# Patient Record
Sex: MALE | Race: WHITE | NOT HISPANIC OR LATINO
[De-identification: names, ages, dates, MRNs, and addresses within clinical notes are randomized per-mention and may not be internally consistent; named-entity substitution may affect disease eponyms.]

---

## 2020-08-12 ENCOUNTER — APPOINTMENT (OUTPATIENT)
Dept: ORTHOPEDIC SURGERY | Facility: CLINIC | Age: 65
End: 2020-08-12
Payer: COMMERCIAL

## 2020-08-12 VITALS
HEIGHT: 77 IN | BODY MASS INDEX: 28.93 KG/M2 | HEART RATE: 77 BPM | DIASTOLIC BLOOD PRESSURE: 94 MMHG | SYSTOLIC BLOOD PRESSURE: 160 MMHG | WEIGHT: 245 LBS

## 2020-08-12 DIAGNOSIS — Z87.39 PERSONAL HISTORY OF OTHER DISEASES OF THE MUSCULOSKELETAL SYSTEM AND CONNECTIVE TISSUE: ICD-10-CM

## 2020-08-12 PROCEDURE — 99203 OFFICE O/P NEW LOW 30 MIN: CPT

## 2020-08-12 PROCEDURE — 73560 X-RAY EXAM OF KNEE 1 OR 2: CPT | Mod: RT

## 2020-08-17 NOTE — ADDENDUM
[FreeTextEntry1] : This note was written by Felicitas Penaloza on 08/17/2020 acting as a scribe for STEVE AGUILAR III, MD

## 2020-08-17 NOTE — DISCUSSION/SUMMARY
[de-identified] : At this time, due to osteoarthritis of the right knee, we had a long discussion concerning his options.  At this point, we are going to start him on a custom GII .  We discussed viscosupplementation.\par \par

## 2020-08-17 NOTE — HISTORY OF PRESENT ILLNESS
[(Does not interfere) 0] : the ailment interference is 0/10 (does not interfere) [1] : the ailment interference is 1/10 [5] : the ailment interference is 5/10 [de-identified] : The patient comes in today with complaints of pain to his right knee.  He states it has been going on for several months, getting a little worse recently.  The patient describes the pain as sharp at times.  The patient notes rest makes his symptoms better, while stairs makes his symptoms worse.  The patient indicates a pain level of 6 on a pain scale of 0-10.    [] : No

## 2020-08-17 NOTE — PHYSICAL EXAM
[de-identified] : Left Knee: \par Range of Motion in Degrees	\par 	                  Claimant:	Normal:	\par Flexion Active	  135 	                135-degrees	\par Flexion Passive	  135	                135-degrees	\par Extension Active	  0-5	                0-5-degrees	\par Extension Passive	  0-5	                0-5-degrees	\par \par No weakness to flexion/extension.  No evidence of instability in the AP plane or varus or valgus stress.  Negative  Lachman.  Negative pivot shift.  Negative anterior drawer test.  Negative posterior drawer test.  Negative Gonzalo.  Negative Apley grind.  No medial or lateral joint line tenderness.  No tenderness over the medial and lateral facet of the patella.  No patellofemoral crepitations.  No lateral tilting patella.  No patellar apprehension.  No crepitation in the medial and lateral femoral condyle.  No proximal or distal swelling, edema or tenderness.  No gross motor or sensory deficits.  No intra-articular swelling.  2+ DP and PT pulses. No varus or valgus malalignment.  Skin is intact.  No rashes, scars or lesions.\par \par Right Knee: \par Range of Motion in Degrees	\par 	                  Claimant:	Normal:	\par Flexion Active	  120 	                135-degrees	\par Flexion Passive	  120	                135-degrees	\par Extension Active	   10	                0-5-degrees	\par Extension Passive	   10	                0-5-degrees	\par \par No weakness to flexion/extension.  No evidence of instability in the AP plane or varus or valgus stress.  Negative  Lachman.  Negative pivot shift.  Negative anterior drawer test.  Negative posterior drawer test.  Negative Gonzalo.  Negative Apley grind.  No medial or lateral joint line tenderness.  Positive tenderness over the medial and lateral facet of the patella.  Positive patellofemoral crepitations.  No lateral tilting patella.  No patella apprehension.  Positive crepitation in the medial and lateral femoral condyle.  No proximal or distal swelling, edema or tenderness.  No gross motor or sensory deficits.  Mild intra-articular swelling.  2+ DP and PT pulses.  Mild varus deformity.  Skin is intact.  No rashes, scars or lesions.  \par     [de-identified] : Ambulating with a slightly antalgic to antalgic gait.  Station:  Normal.  [de-identified] : Appearance:  Well-developed, well-nourished male in no acute distress.\par \par   [de-identified] : Radiographs, two views of the right knee, show bone-on-bone medial compartment arthritis.

## 2021-04-14 ENCOUNTER — APPOINTMENT (OUTPATIENT)
Dept: ORTHOPEDIC SURGERY | Facility: CLINIC | Age: 66
End: 2021-04-14

## 2021-04-15 ENCOUNTER — APPOINTMENT (OUTPATIENT)
Dept: ORTHOPEDIC SURGERY | Facility: CLINIC | Age: 66
End: 2021-04-15
Payer: COMMERCIAL

## 2021-04-15 VITALS
HEART RATE: 72 BPM | TEMPERATURE: 97.7 F | HEIGHT: 77 IN | DIASTOLIC BLOOD PRESSURE: 85 MMHG | SYSTOLIC BLOOD PRESSURE: 150 MMHG | WEIGHT: 234 LBS | BODY MASS INDEX: 27.63 KG/M2

## 2021-04-15 PROCEDURE — 20610 DRAIN/INJ JOINT/BURSA W/O US: CPT | Mod: RT

## 2021-04-15 PROCEDURE — 99072 ADDL SUPL MATRL&STAF TM PHE: CPT

## 2021-04-15 PROCEDURE — 73560 X-RAY EXAM OF KNEE 1 OR 2: CPT | Mod: RT

## 2021-04-15 PROCEDURE — 99214 OFFICE O/P EST MOD 30 MIN: CPT | Mod: 25

## 2021-04-15 RX ORDER — SODIUM HYALURONATE INTRA-ARTICULAR SOLN PREF SYR 25 MG/2.5ML 25/2.5 MG/ML
25 SOLUTION PREFILLED SYRINGE INTRAARTICULAR
Qty: 5 | Refills: 0 | Status: ACTIVE | OUTPATIENT
Start: 2021-04-15

## 2021-04-16 RX ORDER — HYALURONATE SODIUM 30 MG/2 ML
30 SYRINGE (ML) INTRAARTICULAR
Qty: 5 | Refills: 0 | Status: ACTIVE | OUTPATIENT
Start: 2021-04-16

## 2021-04-21 NOTE — PROCEDURE
[de-identified] : Consent: \par At this time, I have recommended an injection to the right knee.  The risks and benefits of the procedure were discussed with the patient in detail.  Upon verbal consent of the patient, we proceeded with the injection as noted below.  \par \par Procedure:  \par After a sterile prep, the patient underwent an injection of 9 cc of 1% Lidocaine without epinephrine and 1 cc of Kenalog into the right knee.  The patient tolerated the procedure well.  There were no complications.  \par \par

## 2021-04-21 NOTE — PHYSICAL EXAM
[de-identified] : Right Knee: \par Range of Motion in Degrees	\par 	                  Claimant:	Normal:	\par Flexion Active	  135 	                135-degrees	\par Flexion Passive	  135	                135-degrees	\par Extension Active	  0-5	                0-5-degrees	\par Extension Passive	  0-5	                0-5-degrees	\par \par No weakness to flexion/extension.  No evidence of instability in the AP plane or varus or valgus stress.  Negative  Lachman.  Negative pivot shift.  Negative anterior drawer test.  Negative posterior drawer test.  Negative Gonzalo.  Negative Apley grind.  No medial or lateral joint line tenderness.  Positive tenderness over the medial and lateral facet of the patella.  Positive patellofemoral crepitations.  No lateral tilting patella.  No patella apprehension.  Positive crepitation in the medial and lateral femoral condyle.  No proximal or distal swelling, edema or tenderness.  No gross motor or sensory deficits.  Effusion.  2+ DP and PT pulses.  No varus or valgus malalignment.  Skin is intact.  No rashes, scars or lesions.  \par   [de-identified] : Ambulating with a slightly antalgic to antalgic gait.  Station:  Normal.  [de-identified] : Appearance:  Well-developed, well-nourished male in no acute distress.\par   [de-identified] : Radiographs, two views of the right knee, reveals osteoarthritis with bone-on-bone on the medial side.

## 2021-04-21 NOTE — ADDENDUM
[FreeTextEntry1] : This note was dictated by Irma Hernandez, OTR/L, PA \par \par This note was written by Felicitas Penaloza on 04/20/2021 acting as a scribe for STEVE AGUILAR III, MD

## 2021-04-21 NOTE — DISCUSSION/SUMMARY
[de-identified] : At this time, due to osteoarthritis of the right knee, the patient was advised to ice it.  He is advised to return to the office in a period of one week to make sure the effusion has gone down. We will order Supartz injections to the right knee.  In the meantime, he will ice and take anti-inflammatories.  A total knee replacement was discussed with the patient.  At this time, the patient states he is not looking to get any kind of total knee replacement.  Again, he is advised to ice and return to the office in one week if he is still having pain.  We are ordering the Supartz injections.

## 2021-05-06 ENCOUNTER — APPOINTMENT (OUTPATIENT)
Dept: ORTHOPEDIC SURGERY | Facility: CLINIC | Age: 66
End: 2021-05-06
Payer: COMMERCIAL

## 2021-05-06 ENCOUNTER — NON-APPOINTMENT (OUTPATIENT)
Age: 66
End: 2021-05-06

## 2021-05-06 PROCEDURE — 99072 ADDL SUPL MATRL&STAF TM PHE: CPT

## 2021-05-06 PROCEDURE — 20610 DRAIN/INJ JOINT/BURSA W/O US: CPT | Mod: RT

## 2021-05-10 NOTE — PROCEDURE
[de-identified] : Physical Examination\par Right Knee:\par Range of Motion in Degrees  	\par    	                 Claimant:  	Normal:  	\par Flexion Active  	 135     	                135-degrees  	\par Flexion Passive  	 135  	                135-degrees  	\par Extension Active  	 0-5  	                0-5-degrees  	\par Extension Passive  	 0-5  	                0-5-degrees  	\par \par No evidence of instability in the AP plane or varus or valgus stress.  Negative  Lachman. Negative pivot shift.  Negative anterior drawer test.  Negative posterior drawer test.  Negative Gonzalo.  Negative Apley grind.  No medial or lateral joint line tenderness.  Positive tenderness over the medial and lateral facet of the patella.  Positive patellofemoral crepitations.  No lateral tilting patella.  No patellar apprehension.  Positive crepitation in the medial and lateral femoral condyle.  No proximal or distal swelling, edema or tenderness.  No gross motor or sensory deficits.  Mild intra-articular swelling.  No varus or valgus malalignment.  2+ DP and PT pulses.  Skin is intact.  No rashes, scars or lesions.  \par \par Consent:\par The risks and benefits of the procedure were discussed with the patient in detail.  Upon verbal consent of the patient, we proceeded with the Orthovisc injection as noted below.  \par \par Procedure: \par After sterile prep, the patient underwent an Orthovisc injection of 30 mg of Sodium Hyaluronate in a 2ML syringe into the right knee.  The patient tolerated the procedure well.  There were no complications.   \par \par NDC #:  [address]-47\par LOT #:  [id number]\par EXPIRATION:  9/30/22\par \par Plan:  \par At this time, I have recommended ice and elevation.  The patient will be reassessed in one week for the next Orthovisc injection for the osteoarthritis of the right knee.

## 2021-05-10 NOTE — ADDENDUM
[FreeTextEntry1] : This note was written by Makenzie Suggs on 05/07/2021 acting as scribe for Fortunato Guo III, MD

## 2021-05-13 ENCOUNTER — APPOINTMENT (OUTPATIENT)
Dept: ORTHOPEDIC SURGERY | Facility: CLINIC | Age: 66
End: 2021-05-13
Payer: COMMERCIAL

## 2021-05-13 PROCEDURE — 20610 DRAIN/INJ JOINT/BURSA W/O US: CPT | Mod: RT

## 2021-05-13 PROCEDURE — 99072 ADDL SUPL MATRL&STAF TM PHE: CPT

## 2021-05-17 NOTE — PROCEDURE
[de-identified] : Physical Examination\par Right Knee:\par Range of Motion in Degrees 	\par  	 Claimant: 	Normal: 	\par Flexion Active 	 135 	 135-degrees 	\par Flexion Passive 	 135 	 135-degrees 	\par Extension Active 	 0-5 	 0-5-degrees 	\par Extension Passive 	 0-5 	 0-5-degrees 	\par \par No evidence of instability in the AP plane or varus or valgus stress. Negative Lachman. Negative pivot shift. Negative anterior drawer test. Negative posterior drawer test. Negative Gonzalo. Negative Apley grind. No medial or lateral joint line tenderness. Positive tenderness over the medial and lateral facet of the patella. Positive patellofemoral crepitations. No lateral tilting patella. No patellar apprehension. Positive crepitation in the medial and lateral femoral condyle. No proximal or distal swelling, edema or tenderness. No gross motor or sensory deficits. Mild intra-articular swelling. No varus or valgus malalignment. 2+ DP and PT pulses. Skin is intact. No rashes, scars or lesions. \par \par Consent:\par The risks and benefits of the procedure were discussed with the patient in detail. Upon verbal consent of the patient, we proceeded with the Orthovisc injection as noted below. \par \par Procedure: \par After sterile prep, the patient underwent an Orthovisc injection of 30 mg of Sodium Hyaluronate in a 2ML syringe into the right knee. The patient tolerated the procedure well. There were no complications. \par \par NDC #: [address]-20\par LOT #: [id number]\par EXPIRATION: 9/30/22\par \par Plan: \par At this time, I have recommended ice and elevation. The patient will be reassessed in one week for the next Orthovisc injection for the osteoarthritis of the right knee. \par

## 2021-05-17 NOTE — ADDENDUM
[FreeTextEntry1] : This note was written by Felicitas Penaloza on 05/17/2021 acting as a scribe for STEVE AGUILAR III, MD

## 2021-05-19 ENCOUNTER — APPOINTMENT (OUTPATIENT)
Dept: ORTHOPEDIC SURGERY | Facility: CLINIC | Age: 66
End: 2021-05-19
Payer: COMMERCIAL

## 2021-05-19 PROCEDURE — 99072 ADDL SUPL MATRL&STAF TM PHE: CPT

## 2021-05-19 PROCEDURE — 20610 DRAIN/INJ JOINT/BURSA W/O US: CPT | Mod: RT

## 2021-05-24 NOTE — PROCEDURE
[de-identified] : Physical Examination\par Right Knee:\par Range of Motion in Degrees 	\par  	 Claimant: 	Normal: 	\par Flexion Active 	 135 	 135-degrees 	\par Flexion Passive 	 135 	 135-degrees 	\par Extension Active 	 0-5 	 0-5-degrees 	\par Extension Passive 	 0-5 	 0-5-degrees 	\par \par No evidence of instability in the AP plane or varus or valgus stress. Negative Lachman. Negative pivot shift. Negative anterior drawer test. Negative posterior drawer test. Negative Gonzalo. Negative Apley grind. No medial or lateral joint line tenderness. Positive tenderness over the medial and lateral facet of the patella. Positive patellofemoral crepitations. No lateral tilting patella. No patellar apprehension. Positive crepitation in the medial and lateral femoral condyle. No proximal or distal swelling, edema or tenderness. No gross motor or sensory deficits. Mild intra-articular swelling. No varus or valgus malalignment. 2+ DP and PT pulses. Skin is intact. No rashes, scars or lesions. \par \par Consent:\par The risks and benefits of the procedure were discussed with the patient in detail. Upon verbal consent of the patient, we proceeded with the Orthovisc injection as noted below. \par \par Procedure: \par After sterile prep, the patient underwent an Orthovisc injection of 30 mg of Sodium Hyaluronate in a 2ML syringe into the right knee. The patient tolerated the procedure well. There were no complications. \par \par NDC #: [address]-36\par LOT #: [id number]\par EXPIRATION: 9/30/22\par \par Plan: \par At this time, I have recommended ice and elevation. The patient will be reassessed in one week for the next Orthovisc injection for the osteoarthritis of the right knee.

## 2021-05-24 NOTE — ADDENDUM
[FreeTextEntry1] : This note was written by Makenzie Suggs on 05/21/2021 acting as scribe for Fortunato Guo III, MD

## 2021-05-27 ENCOUNTER — APPOINTMENT (OUTPATIENT)
Dept: ORTHOPEDIC SURGERY | Facility: CLINIC | Age: 66
End: 2021-05-27
Payer: COMMERCIAL

## 2021-05-27 PROCEDURE — 20610 DRAIN/INJ JOINT/BURSA W/O US: CPT | Mod: RT

## 2021-05-27 PROCEDURE — 99072 ADDL SUPL MATRL&STAF TM PHE: CPT

## 2021-06-02 NOTE — ADDENDUM
[FreeTextEntry1] : This note was written by Anitra Funes on 06/01/2021 acting as scribe for Fortunato Guo III, MD

## 2021-06-02 NOTE — PROCEDURE
[de-identified] : Physical Examination\par Right Knee:\par Range of Motion in Degrees 	\par  	 Claimant: 	Normal: 	\par Flexion Active 	 135 	 135-degrees 	\par Flexion Passive 	 135 	 135-degrees 	\par Extension Active 	 0-5 	 0-5-degrees 	\par Extension Passive 	 0-5 	 0-5-degrees 	\par \par No evidence of instability in the AP plane or varus or valgus stress. Negative Lachman. Negative pivot shift. Negative anterior drawer test. Negative posterior drawer test. Negative Gonzalo. Negative Apley grind. No medial or lateral joint line tenderness. Positive tenderness over the medial and lateral facet of the patella. Positive patellofemoral crepitations. No lateral tilting patella. No patellar apprehension. Positive crepitation in the medial and lateral femoral condyle. No proximal or distal swelling, edema or tenderness. No gross motor or sensory deficits. Mild intra-articular swelling. No varus or valgus malalignment. 2+ DP and PT pulses. Skin is intact. No rashes, scars or lesions. \par \par Consent:\par The risks and benefits of the procedure were discussed with the patient in detail. Upon verbal consent of the patient, we proceeded with the Orthovisc injection as noted below. \par \par Procedure: \par After sterile prep, the patient underwent an Orthovisc injection of 30 mg of Sodium Hyaluronate in a 2ML syringe into the right knee. The patient tolerated the procedure well. There were no complications. \par \par NDC #: [address]-55\par LOT #: [id number]\par EXPIRATION:  09/30/22\par \par Plan: \par At this time, I have recommended ice and elevation. The patient will be reassessed in 6-8 weeks for the osteoarthritis of the right knee.

## 2021-06-02 NOTE — REASON FOR VISIT
[Procedure Visit] : a procedure visit for [FreeTextEntry2] : his fourth Orthovisc injection into his right knee

## 2022-02-07 ENCOUNTER — APPOINTMENT (OUTPATIENT)
Dept: ORTHOPEDIC SURGERY | Facility: CLINIC | Age: 67
End: 2022-02-07
Payer: COMMERCIAL

## 2022-02-07 VITALS
DIASTOLIC BLOOD PRESSURE: 77 MMHG | WEIGHT: 234 LBS | SYSTOLIC BLOOD PRESSURE: 155 MMHG | BODY MASS INDEX: 27.63 KG/M2 | HEART RATE: 74 BPM | HEIGHT: 77 IN

## 2022-02-07 PROCEDURE — 73560 X-RAY EXAM OF KNEE 1 OR 2: CPT | Mod: RT

## 2022-02-10 NOTE — HISTORY OF PRESENT ILLNESS
[de-identified] : The patient comes in today with persistent complaints to his right knee.  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

## 2022-02-10 NOTE — PHYSICAL EXAM
[de-identified] : Right Knee: \par Range of Motion in Degrees	\par 	                  Claimant:	Normal:	\par Flexion Active	  135 	                135-degrees	\par Flexion Passive	  135	                135-degrees	\par Extension Active	  0-5	                0-5-degrees	\par Extension Passive	  0-5	                0-5-degrees	\par \par No weakness to flexion/extension.  No evidence of instability in the AP plane or varus or valgus stress.  Negative  Lachman.  Negative pivot shift.  Negative anterior drawer test.  Negative posterior drawer test.  Negative Gonzalo.  Negative Apley grind.  No medial or lateral joint line tenderness.  Positive tenderness over the medial and lateral facet of the patella.  Positive patellofemoral crepitations.  No lateral tilting patella.  No patella apprehension.  Positive crepitation in the medial and lateral femoral condyle.  No proximal or distal swelling, edema or tenderness.  No gross motor or sensory deficits.  Mild intra-articular swelling.  2+ DP and PT pulses.  No varus or valgus malalignment.  Skin is intact.  No rashes, scars or lesions.  \par   [de-identified] : Ambulating with a slightly antalgic to antalgic gait.  Station:  Normal.  [de-identified] : Appearance:  Well-developed, well-nourished male in no acute distress.\par \par   [de-identified] : Radiographs, two views of the right knee, show bone-on-bone medial compartment arthritis.

## 2022-02-10 NOTE — ADDENDUM
[FreeTextEntry1] : This note was written by Felicitas Penaloza on 02/10/2022 acting as a scribe for STEVE AGUILAR III, MD

## 2022-02-10 NOTE — DISCUSSION/SUMMARY
[de-identified] : At this time, due to osteoarthritis of the right knee, I recommend ice and elevation.  He will be reassessed in eight weeks.

## 2022-03-24 ENCOUNTER — APPOINTMENT (OUTPATIENT)
Dept: ORTHOPEDIC SURGERY | Facility: CLINIC | Age: 67
End: 2022-03-24

## 2022-05-24 ENCOUNTER — APPOINTMENT (OUTPATIENT)
Dept: ORTHOPEDIC SURGERY | Facility: CLINIC | Age: 67
End: 2022-05-24
Payer: COMMERCIAL

## 2022-05-24 VITALS — WEIGHT: 234 LBS | BODY MASS INDEX: 27.63 KG/M2 | HEIGHT: 77 IN

## 2022-05-24 DIAGNOSIS — Z78.9 OTHER SPECIFIED HEALTH STATUS: ICD-10-CM

## 2022-05-24 PROCEDURE — 99214 OFFICE O/P EST MOD 30 MIN: CPT

## 2022-05-24 PROCEDURE — 73562 X-RAY EXAM OF KNEE 3: CPT | Mod: RT

## 2022-05-24 NOTE — IMAGING
[Right] : right knee [All Views] : anteroposterior, lateral, skyline, and anteroposterior standing [Moderate tricompartmental OA medial narrowing] : Moderate tricompartmental OA medial narrowing [de-identified] : Right knee: ROM 3-130.  Medial tendneress.  NVI.  Lig stable.  Varus deformity.

## 2022-05-24 NOTE — DISCUSSION/SUMMARY
[de-identified] : We discussed my findings and the natural history of their condition.  We talked about the details of the proposed surgery and the recovery.  We discussed the material risks, possible benefits and alternatives to surgery.  The risks include but are not limited to infection, bleeding and possible need for blood transfusion, fracture, bowel blockage, bladder retention or infection, need for reoperation, stiffness and/or limited range of motion, possible damage to nerves and blood vessels, failure of fixation of components, risk of deep vein thromboses and pulmonary embolism, wound healing problems, dislocation, and possible leg length discrepancy.  Although incredibly rare, we also discussed the risks of a cardiac event, stroke and even death during, or following, the surgery.  We discussed the type of implants the patient will be receiving and the type of fixation that will be used, as well as whether a robot or computer navigation aide will be used.  The patient understands they will need medical clearance and will attend a preoperative joint education class.  We also discussed the type of anesthesia they will receive, and the risks associated with hospital or rehab length of stay, obesity, diabetes and smoking.\par \par The natural progression of Osteoarthritis was explained to the patient.  We discussed the possible treatment options from conservative to operative.  These included NSAIDS, Glucosamine and Chondrotin sulfate, and Physical Therapy as well different types of injections.  We also discussed that at some point they may progress to needed a TKA.  Information and pamphlets were given when appropriate.\par

## 2022-05-24 NOTE — ASSESSMENT
[FreeTextEntry1] : Adv OA right knee.  Discussed options - he has failed PT/HEP, NSAIDs, HA and cortisone injections.  He would like to proceed with right TKA, no signifcant medical hx.

## 2022-05-24 NOTE — HISTORY OF PRESENT ILLNESS
[10] : 10 [8] : 8 [Dull/Aching] : dull/aching [Localized] : localized [Standing] : standing [Walking] : walking [Stairs] : stairs [Full time] : Work status: full time [de-identified] : RIght knee pain x 2 years.  NO relief with cortisone and HA injections.  Pain daily, worse with walking.  No groin pain. [] : Post Surgical Visit: no [FreeTextEntry1] : RIGHT KNEE [FreeTextEntry5] : patient is here with knee pain for about a year\par patient complains of bone on bone\par h/o of gel injections in the right knee

## 2022-05-31 ENCOUNTER — FORM ENCOUNTER (OUTPATIENT)
Age: 67
End: 2022-05-31

## 2022-06-01 ENCOUNTER — APPOINTMENT (OUTPATIENT)
Dept: CT IMAGING | Facility: CLINIC | Age: 67
End: 2022-06-01
Payer: COMMERCIAL

## 2022-06-01 PROCEDURE — 76376 3D RENDER W/INTRP POSTPROCES: CPT | Mod: RT

## 2022-06-01 PROCEDURE — 73700 CT LOWER EXTREMITY W/O DYE: CPT | Mod: RT

## 2022-07-29 ENCOUNTER — OUTPATIENT (OUTPATIENT)
Dept: OUTPATIENT SERVICES | Facility: HOSPITAL | Age: 67
LOS: 1 days | Discharge: HOME | End: 2022-07-29

## 2022-07-29 ENCOUNTER — TRANSCRIPTION ENCOUNTER (OUTPATIENT)
Age: 67
End: 2022-07-29

## 2022-07-29 VITALS
WEIGHT: 242.07 LBS | OXYGEN SATURATION: 96 % | RESPIRATION RATE: 17 BRPM | SYSTOLIC BLOOD PRESSURE: 143 MMHG | HEART RATE: 59 BPM | TEMPERATURE: 97 F | HEIGHT: 77 IN | DIASTOLIC BLOOD PRESSURE: 82 MMHG

## 2022-07-29 DIAGNOSIS — Z01.818 ENCOUNTER FOR OTHER PREPROCEDURAL EXAMINATION: ICD-10-CM

## 2022-07-29 DIAGNOSIS — M17.11 UNILATERAL PRIMARY OSTEOARTHRITIS, RIGHT KNEE: ICD-10-CM

## 2022-07-29 LAB
A1C WITH ESTIMATED AVERAGE GLUCOSE RESULT: 6.7 % — HIGH (ref 4–5.6)
ALBUMIN SERPL ELPH-MCNC: 3.6 G/DL — SIGNIFICANT CHANGE UP (ref 3.3–5)
ALP SERPL-CCNC: 66 U/L — SIGNIFICANT CHANGE UP (ref 40–120)
ALT FLD-CCNC: 62 U/L — SIGNIFICANT CHANGE UP (ref 12–78)
ANION GAP SERPL CALC-SCNC: 6 MMOL/L — SIGNIFICANT CHANGE UP (ref 5–17)
APTT BLD: 35.7 SEC — HIGH (ref 27.5–35.5)
AST SERPL-CCNC: 39 U/L — HIGH (ref 15–37)
BASOPHILS # BLD AUTO: 0.03 K/UL — SIGNIFICANT CHANGE UP (ref 0–0.2)
BASOPHILS NFR BLD AUTO: 0.4 % — SIGNIFICANT CHANGE UP (ref 0–2)
BILIRUB SERPL-MCNC: 0.7 MG/DL — SIGNIFICANT CHANGE UP (ref 0.2–1.2)
BUN SERPL-MCNC: 17 MG/DL — SIGNIFICANT CHANGE UP (ref 7–23)
CALCIUM SERPL-MCNC: 8.8 MG/DL — SIGNIFICANT CHANGE UP (ref 8.5–10.1)
CHLORIDE SERPL-SCNC: 108 MMOL/L — SIGNIFICANT CHANGE UP (ref 96–108)
CO2 SERPL-SCNC: 27 MMOL/L — SIGNIFICANT CHANGE UP (ref 22–31)
CREAT SERPL-MCNC: 0.8 MG/DL — SIGNIFICANT CHANGE UP (ref 0.5–1.3)
EGFR: 97 ML/MIN/1.73M2 — SIGNIFICANT CHANGE UP
EOSINOPHIL # BLD AUTO: 0.25 K/UL — SIGNIFICANT CHANGE UP (ref 0–0.5)
EOSINOPHIL NFR BLD AUTO: 3.1 % — SIGNIFICANT CHANGE UP (ref 0–6)
ESTIMATED AVERAGE GLUCOSE: 146 MG/DL — HIGH (ref 68–114)
GLUCOSE SERPL-MCNC: 149 MG/DL — HIGH (ref 70–99)
HCT VFR BLD CALC: 42.6 % — SIGNIFICANT CHANGE UP (ref 39–50)
HGB BLD-MCNC: 14.5 G/DL — SIGNIFICANT CHANGE UP (ref 13–17)
IMM GRANULOCYTES NFR BLD AUTO: 0.2 % — SIGNIFICANT CHANGE UP (ref 0–1.5)
INR BLD: 1.09 RATIO — SIGNIFICANT CHANGE UP (ref 0.88–1.16)
LYMPHOCYTES # BLD AUTO: 4.67 K/UL — HIGH (ref 1–3.3)
LYMPHOCYTES # BLD AUTO: 58.2 % — HIGH (ref 13–44)
MCHC RBC-ENTMCNC: 30 PG — SIGNIFICANT CHANGE UP (ref 27–34)
MCHC RBC-ENTMCNC: 34 G/DL — SIGNIFICANT CHANGE UP (ref 32–36)
MCV RBC AUTO: 88 FL — SIGNIFICANT CHANGE UP (ref 80–100)
MONOCYTES # BLD AUTO: 0.67 K/UL — SIGNIFICANT CHANGE UP (ref 0–0.9)
MONOCYTES NFR BLD AUTO: 8.3 % — SIGNIFICANT CHANGE UP (ref 2–14)
MRSA PCR RESULT.: SIGNIFICANT CHANGE UP
NEUTROPHILS # BLD AUTO: 2.39 K/UL — SIGNIFICANT CHANGE UP (ref 1.8–7.4)
NEUTROPHILS NFR BLD AUTO: 29.8 % — LOW (ref 43–77)
NRBC # BLD: 0 /100 WBCS — SIGNIFICANT CHANGE UP (ref 0–0)
PLATELET # BLD AUTO: 118 K/UL — LOW (ref 150–400)
POTASSIUM SERPL-MCNC: 4.1 MMOL/L — SIGNIFICANT CHANGE UP (ref 3.5–5.3)
POTASSIUM SERPL-SCNC: 4.1 MMOL/L — SIGNIFICANT CHANGE UP (ref 3.5–5.3)
PROT SERPL-MCNC: 7.3 GM/DL — SIGNIFICANT CHANGE UP (ref 6–8.3)
PROTHROM AB SERPL-ACNC: 13.1 SEC — SIGNIFICANT CHANGE UP (ref 10.5–13.4)
RBC # BLD: 4.84 M/UL — SIGNIFICANT CHANGE UP (ref 4.2–5.8)
RBC # FLD: 13.9 % — SIGNIFICANT CHANGE UP (ref 10.3–14.5)
S AUREUS DNA NOSE QL NAA+PROBE: SIGNIFICANT CHANGE UP
SODIUM SERPL-SCNC: 141 MMOL/L — SIGNIFICANT CHANGE UP (ref 135–145)
VIT D25+D1,25 OH+D1,25 PNL SERPL-MCNC: 60.4 PG/ML — SIGNIFICANT CHANGE UP (ref 19.9–79.3)
WBC # BLD: 8.03 K/UL — SIGNIFICANT CHANGE UP (ref 3.8–10.5)
WBC # FLD AUTO: 8.03 K/UL — SIGNIFICANT CHANGE UP (ref 3.8–10.5)

## 2022-07-29 PROCEDURE — 93010 ELECTROCARDIOGRAM REPORT: CPT

## 2022-07-29 NOTE — PHYSICAL THERAPY INITIAL EVALUATION ADULT - GENERAL OBSERVATIONS, REHAB EVAL
Patient was seen seated  in chair in PT/OT preoperative assessment room with R knee genu varum and no apparent distress. Height:  6'5"     ; weight :    242 lbs.

## 2022-07-29 NOTE — PHYSICAL THERAPY INITIAL EVALUATION ADULT - PERTINENT HX OF CURRENT PROBLEM, REHAB EVAL
R knee OA c chronic pain and  limiting functional mobility. Pt is scheduled for R knee elective total joint replacement on  8/17/22 by  Dr. Massey.

## 2022-07-29 NOTE — H&P PST ADULT - PROBLEM SELECTOR PLAN 2
Assessment and Plan: labs - cbc,pt/ptt,bmp,t&s,nose cx,ekg  M/C required  preop 3 day hibiclens instruction reviewed and given .instructed on if  nose cx positive use mupuricin 5 days and checklist given  take routine meds DOS with sips of water. avoid NSAID and OTC supplements. verbalized understanding  information on proper nutrition , increase protein and better food choices provided in packet  ensure clear  patient instructed on having covid19 swab 3-5 days prior to surgery  anesthesiologist to review pst labs, ekg, medical clearances and optimization for surgery

## 2022-07-29 NOTE — H&P PST ADULT - NSANTHOSAYNRD_GEN_A_CORE
No. HUMAIRA screening performed.  STOP BANG Legend: 0-2 = LOW Risk; 3-4 = INTERMEDIATE Risk; 5-8 = HIGH Risk

## 2022-07-29 NOTE — PHYSICAL THERAPY INITIAL EVALUATION ADULT - ADDITIONAL COMMENTS
There is one platform step, wide enough to fit a rolling walker,  at the entry of the house and 12-13 steps, c R rail up, to negotiate at home. Pt has a tub/shower combo c fixed shower head, no grab bar, and regular toilet seat  in BR. 3-1 commode will fit in BR. Average pain level at rest is 9/10. Pain increases to 10/10 c standing and ambulation. Pt does not take any pain meds nor apply modalities for pain management. Pt manage his pain by going to sleep and rest. Pt has no experience of adverse effects with pain medication. Pt is not on out-pt physical therapy at present. There is no h/o fall or knee buckling recently. Pt wears glasses for reading and distance and wears jose alejandro hearing aid. Pt is R handed and drives.

## 2022-07-29 NOTE — H&P PST ADULT - ASSESSMENT
right knee osteoarthritis   CAPRINI SCORE    AGE RELATED RISK FACTORS                                                       MOBILITY RELATED FACTORS  [ ] Age 41-60 years                                            (1 Point)                  [ ] Bed rest                                                        (1 Point)  [x ] Age: 61-74 years                                           (2 Points)                [ ] Plaster cast                                                   (2 Points)  [ ] Age= 75 years                                              (3 Points)                 [ ] Bed bound for more than 72 hours                   (2 Points)    DISEASE RELATED RISK FACTORS                                               GENDER SPECIFIC FACTORS  [ ] Edema in the lower extremities                       (1 Point)                  [ ] Pregnancy                                                     (1 Point)  [ ] Varicose veins                                               (1 Point)                  [ ] Post-partum < 6 weeks                                   (1 Point)             x[ ] BMI > 25 Kg/m2                                            (1 Point)                  [ ] Hormonal therapy  or oral contraception            (1 Point)                 [ ] Sepsis (in the previous month)                        (1 Point)                  [ ] History of pregnancy complications  [ ] Pneumonia or serious lung disease                                               [ ] Unexplained or recurrent                       (1 Point)           (in the previous month)                               (1 Point)  [ ] Abnormal pulmonary function test                     (1 Point)                 SURGERY RELATED RISK FACTORS  [ ] Acute myocardial infarction                              (1 Point)                 [ ]  Section                                            (1 Point)  [ ] Congestive heart failure (in the previous month)  (1 Point)                 [ ] Minor surgery                                                 (1 Point)   [ ] Inflammatory bowel disease                             (1 Point)                 [ ] Arthroscopic surgery                                        (2 Points)  [ ] Central venous access                                    (2 Points)                [ ] General surgery lasting more than 45 minutes   (2 Points)       [ ] Stroke (in the previous month)                          (5 Points)               [x ] Elective arthroplasty                                        (5 Points)                                                                                                                                               HEMATOLOGY RELATED FACTORS                                                 TRAUMA RELATED RISK FACTORS  [ ] Prior episodes of VTE                                     (3 Points)                 [ ] Fracture of the hip, pelvis, or leg                       (5 Points)  [ ] Positive family history for VTE                         (3 Points)                 [ ] Acute spinal cord injury (in the previous month)  (5 Points)  [ ] Prothrombin 04259 A                                      (3 Points)                 [ ] Paralysis  (less than 1 month)                          (5 Points)  [ ] Factor V Leiden                                             (3 Points)                 [ ] Multiple Trauma within 1 month                         (5 Points)  [ ] Lupus anticoagulants                                     (3 Points)                                                           [ ] Anticardiolipin antibodies                                (3 Points)                                                       [ ] High homocysteine in the blood                      (3 Points)                                             [ ] Other congenital or acquired thrombophilia       (3 Points)                                                [ ] Heparin induced thrombocytopenia                  (3 Points)                                          Total Score [     8     ]  Caprini Score 0-2: Low risk, No VTE Prophylaxis required for most patient's, encourage ambulation  Caprini Score 3-6: At Risk, Pharmacologic VTE prophylaxis is indicated for most patients ( in the absence of a contraindication)  Caprini Score Greater than or = 7: High Risk , pharmacologic VTE is indicated for most patients ( in the absence of a contraindication)    Caprini score indicates that the patient is high risk for VTE event ( score 6 or greater). Surgical patient's in this group will benefit from both pharmacologic prophylaxis and intermittent compression devices . Surgical team will determine the balance between VTE  risk and bleeding risk and other clinical considerations

## 2022-07-29 NOTE — OCCUPATIONAL THERAPY INITIAL EVALUATION ADULT - ADDITIONAL COMMENTS
Pt lives with family (Who can assist post op) in a private house with 1 platform step to enter (Large enough to fit a rolling walker). Once inside, the pt has 12-13 steps with a R rail to reach the main floor where the bedroom and bathroom is. The pts bathroom has a tub/shower combination, fixed shower head, standard toilet seat and no grab bars. The pt reports that a 3/1 commode can fit over the toilet at home. The pt ambulates with no device and does not own any device for ambulation. The pt daily pain is a 9/10 at rest and a 10/10 with movement. The pt manages the pain with rest and sleeping. The pt has no recent outpatient PT, no recent falls and has no buckling of the knees. The pt wears glasses all the time, R handed, drives and wears bilateral hearing aides.

## 2022-07-29 NOTE — OCCUPATIONAL THERAPY INITIAL EVALUATION ADULT - PERTINENT HX OF CURRENT PROBLEM, REHAB EVAL
R knee OA which impacts pts ability to perform functional tasks/transfers and mobility. Pt is scheduled for R TKR on 8/17/22.

## 2022-07-29 NOTE — H&P PST ADULT - HISTORY OF PRESENT ILLNESS
67 yo male c/o right knee pain secondary to osteoarthritis - scheduled for right  knee arthroplasty  goal: to walk without pain  denies recent travels in the past 30 days. No fever, SOB, cough, flu like symptoms or body rash- covid screen  covid vaccine completed

## 2022-08-16 ENCOUNTER — FORM ENCOUNTER (OUTPATIENT)
Age: 67
End: 2022-08-16

## 2022-08-17 ENCOUNTER — TRANSCRIPTION ENCOUNTER (OUTPATIENT)
Age: 67
End: 2022-08-17

## 2022-08-17 ENCOUNTER — OUTPATIENT (OUTPATIENT)
Dept: OUTPATIENT SERVICES | Facility: HOSPITAL | Age: 67
LOS: 1 days | Discharge: HOME | End: 2022-08-17

## 2022-08-17 ENCOUNTER — APPOINTMENT (OUTPATIENT)
Dept: ORTHOPEDIC SURGERY | Facility: HOSPITAL | Age: 67
End: 2022-08-17

## 2022-08-17 PROCEDURE — 20610 DRAIN/INJ JOINT/BURSA W/O US: CPT | Mod: 59,RT

## 2022-08-17 PROCEDURE — 27447 TOTAL KNEE ARTHROPLASTY: CPT | Mod: RT

## 2022-08-17 PROCEDURE — 20985 CPTR-ASST DIR MS PX: CPT

## 2022-08-17 PROCEDURE — 73560 X-RAY EXAM OF KNEE 1 OR 2: CPT | Mod: 26,RT

## 2022-08-19 NOTE — PHYSICAL THERAPY INITIAL EVALUATION ADULT - GAIT PATTERN USED, PT EVAL
D/C order placed by ADDY Zhou. Verified that script for Cipro was sent to pharmacy, office will call patient for follow up appointment.    Patient educated and instructed on how to flush nephrostomy tube, sent home with multiple saline syringes.    D/C instructions discussed with patient, IV taken out, belongings collected, patient able to ambulate to private vehicle with help of .   2-point gait

## 2022-08-20 PROBLEM — Z78.9 OTHER SPECIFIED HEALTH STATUS: Chronic | Status: ACTIVE | Noted: 2022-07-29

## 2022-08-26 RX ORDER — OXYCODONE 5 MG/1
5 TABLET ORAL EVERY 6 HOURS
Qty: 60 | Refills: 0 | Status: ACTIVE | COMMUNITY
Start: 2022-08-26 | End: 1900-01-01

## 2022-08-29 ENCOUNTER — APPOINTMENT (OUTPATIENT)
Dept: ORTHOPEDIC SURGERY | Facility: CLINIC | Age: 67
End: 2022-08-29

## 2022-08-29 VITALS — BODY MASS INDEX: 27.63 KG/M2 | WEIGHT: 234 LBS | HEIGHT: 77 IN

## 2022-08-29 DIAGNOSIS — M17.11 UNILATERAL PRIMARY OSTEOARTHRITIS, RIGHT KNEE: ICD-10-CM

## 2022-08-29 PROCEDURE — 99024 POSTOP FOLLOW-UP VISIT: CPT

## 2022-08-29 PROCEDURE — 73562 X-RAY EXAM OF KNEE 3: CPT | Mod: RT

## 2022-08-29 NOTE — IMAGING
[AP] : anteroposterior [Lateral] : lateral [[place]] : skyline [Components well fixed, in good position] : Components well fixed, in good position [de-identified] : Right knee: Inc clean and dry.  ROM 0-115.  Cane.

## 2022-08-29 NOTE — DISCUSSION/SUMMARY
[de-identified] : The patient is doing well at this time. The patient will be started on a course of physical therapy. I recommended that the patient works on range of motion at home and was shown how to do this. I encouraged the patient to increase ambulation. The patient can continue to take Tylenol for occasional discomfort. The patient was advised not to do any dental work for the first three months following the surgery. We will see the patient  back for a follow-up for a repeat evaluation. The patient  will call or return earlier for any questions or concerns.  Signs and symptoms of infection reviewed and patient advised to call immediately for redness, fevers, and/or chills.\par

## 2022-08-29 NOTE — HISTORY OF PRESENT ILLNESS
[5] : 5 [de-identified] : 8/29/22: 2 weeks s/p right TKA min pain.  Home PT.\par \par Previous doc:\par RIght knee pain x 2 years.  NO relief with cortisone and HA injections.  Pain daily, worse with walking.  No groin pain. [de-identified] : no treatment at this time

## 2022-08-29 NOTE — ASSESSMENT
[FreeTextEntry1] : Previous doc:\par Adv OA right knee.  Discussed options - he has failed PT/HEP, NSAIDs, HA and cortisone injections.  He would like to proceed with right TKA, no signifcant medical hx.\par \par 8/29/22: 2 weeks postop doing very well, cont PT/HEP.

## 2022-09-01 DIAGNOSIS — R73.03 PREDIABETES: ICD-10-CM

## 2022-09-01 DIAGNOSIS — Z79.82 LONG TERM (CURRENT) USE OF ASPIRIN: ICD-10-CM

## 2022-09-01 DIAGNOSIS — Z86.19 PERSONAL HISTORY OF OTHER INFECTIOUS AND PARASITIC DISEASES: ICD-10-CM

## 2022-09-01 DIAGNOSIS — G51.0 BELL'S PALSY: ICD-10-CM

## 2022-09-01 DIAGNOSIS — M17.11 UNILATERAL PRIMARY OSTEOARTHRITIS, RIGHT KNEE: ICD-10-CM

## 2022-09-01 DIAGNOSIS — Z87.891 PERSONAL HISTORY OF NICOTINE DEPENDENCE: ICD-10-CM

## 2022-10-03 ENCOUNTER — APPOINTMENT (OUTPATIENT)
Dept: ORTHOPEDIC SURGERY | Facility: CLINIC | Age: 67
End: 2022-10-03

## 2022-10-03 VITALS — HEIGHT: 77 IN | WEIGHT: 234 LBS | BODY MASS INDEX: 27.63 KG/M2

## 2022-10-03 PROCEDURE — 73562 X-RAY EXAM OF KNEE 3: CPT | Mod: RT

## 2022-10-03 PROCEDURE — 99024 POSTOP FOLLOW-UP VISIT: CPT

## 2022-10-03 NOTE — DISCUSSION/SUMMARY
[de-identified] : The patient is doing well at this time. The patient will be started on a course of physical therapy. I recommended that the patient works on range of motion at home and was shown how to do this. I encouraged the patient to increase ambulation. The patient can continue to take Tylenol for occasional discomfort. The patient was advised not to do any dental work for the first three months following the surgery. We will see the patient  back for a follow-up for a repeat evaluation. The patient  will call or return earlier for any questions or concerns.  Signs and symptoms of infection reviewed and patient advised to call immediately for redness, fevers, and/or chills.\par

## 2022-10-03 NOTE — HISTORY OF PRESENT ILLNESS
[0] : 0 [de-identified] : 10/3 doing well no pain \par \par Previous doc:\par RIght knee pain x 2 years.  NO relief with cortisone and HA injections.  Pain daily, worse with walking.  No groin pain.\par 8/29/22: 2 weeks s/p right TKA min pain.  Home PT. [FreeTextEntry1] : right knee  [de-identified] : PT- 2X a week

## 2022-10-03 NOTE — ASSESSMENT
[FreeTextEntry1] : Previous doc:\par Adv OA right knee.  Discussed options - he has failed PT/HEP, NSAIDs, HA and cortisone injections.  He would like to proceed with right TKA, no signifcant medical hx.\par 8/29/22: 2 weeks postop doing very well, cont PT/HEP.  \par \par 10/3 no pain full ROM - happy to finish PT -

## 2022-10-03 NOTE — IMAGING
[AP] : anteroposterior [Lateral] : lateral [[place]] : skyline [Components well fixed, in good position] : Components well fixed, in good position [de-identified] : Right knee: Inc clean and dry.  ROM 0-115.  Cane.

## 2022-12-30 ENCOUNTER — APPOINTMENT (OUTPATIENT)
Dept: ORTHOPEDIC SURGERY | Facility: CLINIC | Age: 67
End: 2022-12-30
Payer: COMMERCIAL

## 2022-12-30 VITALS — WEIGHT: 234 LBS | BODY MASS INDEX: 27.63 KG/M2 | HEIGHT: 77 IN

## 2022-12-30 DIAGNOSIS — M75.21 BICIPITAL TENDINITIS, RIGHT SHOULDER: ICD-10-CM

## 2022-12-30 PROCEDURE — 73030 X-RAY EXAM OF SHOULDER: CPT | Mod: RT

## 2022-12-30 PROCEDURE — 99214 OFFICE O/P EST MOD 30 MIN: CPT

## 2022-12-30 NOTE — ASSESSMENT
[FreeTextEntry1] : Right proximal biceps tendonitis and FF impingement - reviewed radiographs and pathoanatomy with patient. Discussed management to consist of NSAIDs prn, PT, activity modification.\par \par F/u 6 weeks; consider MRI

## 2022-12-30 NOTE — HISTORY OF PRESENT ILLNESS
[de-identified] : 67M, RHD, No PMHX presents with right shoulder injury from 12/29/22. Patient reports his dog was barking at the window, he went to grab the dog by the collar and his dog puleld forward jerking his shoulder. Admits to having pain, limited ROM. Denies numbness/tingling. difficulty with lifting arm. Denies outside imaging/treatment.

## 2022-12-30 NOTE — IMAGING
[de-identified] : RIGHT SHOULDER EXAM\par +ttp proximal biceps/coracoid\par Skin intact\par No muscle atrophy noted\par Shoulder ROM\par   Forward flexion to 90°; contralateral shoulder 160°\par   Abduction to 160°\par   ER with elbow at side to 45°; contralateral shoulder 45°\par   IR to L1\par \par - Geraldine empty can test\par - Hawkin’s impingement test\par + Speed's test\par - Apprehension test\par - Lift-off test\par - Cross-body adduction test\par \par Rotator cuff strength is 5/5 throughout\par Hand is warm and well perfused with brisk capillary refill throughout\par Motor function intact to axillary, AIN, PIN, and ulnar nerves\par Sensation intact axillary, median, ulnar, and superficial radial nerves\par Elbow and wrist motion intact and full\par Patient able to make a composite fist\par \par Right shoulder radiographs with no fracture nor dislocation. No arthrosis.

## 2023-02-20 ENCOUNTER — APPOINTMENT (OUTPATIENT)
Dept: ORTHOPEDIC SURGERY | Facility: CLINIC | Age: 68
End: 2023-02-20
Payer: COMMERCIAL

## 2023-02-20 PROCEDURE — 99213 OFFICE O/P EST LOW 20 MIN: CPT

## 2023-02-20 PROCEDURE — 73562 X-RAY EXAM OF KNEE 3: CPT | Mod: RT

## 2023-02-20 NOTE — HISTORY OF PRESENT ILLNESS
[Right Leg] : right leg [0] : 0 [Burning] : burning [Shooting] : shooting [Stabbing] : stabbing [Intermittent] : intermittent [Rest] : rest [Exercising] : exercising [de-identified] : 2/20/23: Now 6 months s/p R TKA. Notes new medial pain and "crunching" of the knee for the past month. No new injury.\par \par Previous doc:\par RIght knee pain x 2 years.  NO relief with cortisone and HA injections.  Pain daily, worse with walking.  No groin pain.\par 8/29/22: 2 weeks s/p right TKA min pain.  Home PT. [] : Post Surgical Visit: no [FreeTextEntry1] : right knee  [FreeTextEntry5] : Pt has been having pain at the incision site as well as pain when kneeling and painfull clicking  [de-identified] : PT- 2X a week  [de-identified] : 8/17/22

## 2023-02-20 NOTE — ASSESSMENT
[FreeTextEntry1] : I see no obvious reason for pain - he does have some increased laxity but still stable -  cont normal activity and retrun if worse - as of now on ly min imal pain

## 2023-02-20 NOTE — IMAGING
[de-identified] : rt knee \par tender med jt line - nontendr at MCL\par moderate effusion \par lateral opening in figure 4 - \par small amount of ant shuck \par no reddness inc well healed

## 2023-02-24 ENCOUNTER — APPOINTMENT (OUTPATIENT)
Dept: ORTHOPEDIC SURGERY | Facility: CLINIC | Age: 68
End: 2023-02-24

## 2023-04-03 ENCOUNTER — EMERGENCY (EMERGENCY)
Facility: HOSPITAL | Age: 68
LOS: 0 days | Discharge: HOME | End: 2023-04-03
Attending: STUDENT IN AN ORGANIZED HEALTH CARE EDUCATION/TRAINING PROGRAM
Payer: COMMERCIAL

## 2023-04-03 VITALS
OXYGEN SATURATION: 98 % | TEMPERATURE: 98 F | HEART RATE: 65 BPM | DIASTOLIC BLOOD PRESSURE: 83 MMHG | RESPIRATION RATE: 14 BRPM | SYSTOLIC BLOOD PRESSURE: 128 MMHG

## 2023-04-03 VITALS — WEIGHT: 242.07 LBS | HEIGHT: 77 IN

## 2023-04-03 DIAGNOSIS — K74.60 UNSPECIFIED CIRRHOSIS OF LIVER: ICD-10-CM

## 2023-04-03 DIAGNOSIS — M54.50 LOW BACK PAIN, UNSPECIFIED: ICD-10-CM

## 2023-04-03 DIAGNOSIS — Z79.82 LONG TERM (CURRENT) USE OF ASPIRIN: ICD-10-CM
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
## 2023-04-03 DIAGNOSIS — M54.9 DORSALGIA, UNSPECIFIED: ICD-10-CM

## 2023-04-03 DIAGNOSIS — R79.89 OTHER SPECIFIED ABNORMAL FINDINGS OF BLOOD CHEMISTRY: ICD-10-CM

## 2023-04-03 DIAGNOSIS — Z88.0 ALLERGY STATUS TO PENICILLIN: ICD-10-CM
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
## 2023-04-03 DIAGNOSIS — Z98.890 OTHER SPECIFIED POSTPROCEDURAL STATES: Chronic | ICD-10-CM

## 2023-04-03 DIAGNOSIS — Z87.440 PERSONAL HISTORY OF URINARY (TRACT) INFECTIONS: ICD-10-CM

## 2023-04-03 DIAGNOSIS — R16.1 SPLENOMEGALY, NOT ELSEWHERE CLASSIFIED: ICD-10-CM

## 2023-04-03 LAB
ALBUMIN SERPL ELPH-MCNC: 3.6 G/DL — SIGNIFICANT CHANGE UP (ref 3.3–5)
ALP SERPL-CCNC: 78 U/L — SIGNIFICANT CHANGE UP (ref 40–120)
ALT FLD-CCNC: 94 U/L — HIGH (ref 12–78)
ANION GAP SERPL CALC-SCNC: 4 MMOL/L — LOW (ref 5–17)
APPEARANCE UR: CLEAR — SIGNIFICANT CHANGE UP
AST SERPL-CCNC: 45 U/L — HIGH (ref 15–37)
BACTERIA # UR AUTO: ABNORMAL
BASOPHILS # BLD AUTO: 0 K/UL — SIGNIFICANT CHANGE UP (ref 0–0.2)
BASOPHILS NFR BLD AUTO: 0 % — SIGNIFICANT CHANGE UP (ref 0–2)
BILIRUB SERPL-MCNC: 0.6 MG/DL — SIGNIFICANT CHANGE UP (ref 0.2–1.2)
BILIRUB UR-MCNC: NEGATIVE — SIGNIFICANT CHANGE UP
BUN SERPL-MCNC: 12 MG/DL — SIGNIFICANT CHANGE UP (ref 7–23)
CALCIUM SERPL-MCNC: 9.3 MG/DL — SIGNIFICANT CHANGE UP (ref 8.5–10.1)
CHLORIDE SERPL-SCNC: 106 MMOL/L — SIGNIFICANT CHANGE UP (ref 96–108)
CO2 SERPL-SCNC: 28 MMOL/L — SIGNIFICANT CHANGE UP (ref 22–31)
COLOR SPEC: ABNORMAL
COMMENT - URINE: SIGNIFICANT CHANGE UP
CREAT SERPL-MCNC: 0.88 MG/DL — SIGNIFICANT CHANGE UP (ref 0.5–1.3)
DIFF PNL FLD: NEGATIVE — SIGNIFICANT CHANGE UP
EGFR: 94 ML/MIN/1.73M2 — SIGNIFICANT CHANGE UP
EOSINOPHIL # BLD AUTO: 0.12 K/UL — SIGNIFICANT CHANGE UP (ref 0–0.5)
EOSINOPHIL NFR BLD AUTO: 1 % — SIGNIFICANT CHANGE UP (ref 0–6)
EPI CELLS # UR: NEGATIVE — SIGNIFICANT CHANGE UP
GLUCOSE SERPL-MCNC: 142 MG/DL — HIGH (ref 70–99)
GLUCOSE UR QL: NEGATIVE — SIGNIFICANT CHANGE UP
HCT VFR BLD CALC: 45.6 % — SIGNIFICANT CHANGE UP (ref 39–50)
HGB BLD-MCNC: 15.1 G/DL — SIGNIFICANT CHANGE UP (ref 13–17)
KETONES UR-MCNC: NEGATIVE — SIGNIFICANT CHANGE UP
LEUKOCYTE ESTERASE UR-ACNC: ABNORMAL
LIDOCAIN IGE QN: 114 U/L — SIGNIFICANT CHANGE UP (ref 73–393)
LYMPHOCYTES # BLD AUTO: 66 % — HIGH (ref 13–44)
LYMPHOCYTES # BLD AUTO: 7.96 K/UL — HIGH (ref 1–3.3)
MAGNESIUM SERPL-MCNC: 2.2 MG/DL — SIGNIFICANT CHANGE UP (ref 1.6–2.6)
MANUAL SMEAR VERIFICATION: SIGNIFICANT CHANGE UP
MCHC RBC-ENTMCNC: 29.7 PG — SIGNIFICANT CHANGE UP (ref 27–34)
MCHC RBC-ENTMCNC: 33.1 GM/DL — SIGNIFICANT CHANGE UP (ref 32–36)
MCV RBC AUTO: 89.6 FL — SIGNIFICANT CHANGE UP (ref 80–100)
MONOCYTES # BLD AUTO: 0.96 K/UL — HIGH (ref 0–0.9)
MONOCYTES NFR BLD AUTO: 8 % — SIGNIFICANT CHANGE UP (ref 2–14)
NEUTROPHILS # BLD AUTO: 2.65 K/UL — SIGNIFICANT CHANGE UP (ref 1.8–7.4)
NEUTROPHILS NFR BLD AUTO: 21 % — LOW (ref 43–77)
NEUTS BAND # BLD: 1 % — SIGNIFICANT CHANGE UP (ref 0–8)
NITRITE UR-MCNC: NEGATIVE — SIGNIFICANT CHANGE UP
NRBC # BLD: 0 /100 — SIGNIFICANT CHANGE UP (ref 0–0)
NRBC # BLD: SIGNIFICANT CHANGE UP /100 WBCS (ref 0–0)
PH UR: 5 — SIGNIFICANT CHANGE UP (ref 5–8)
PLAT MORPH BLD: NORMAL — SIGNIFICANT CHANGE UP
PLATELET # BLD AUTO: 184 K/UL — SIGNIFICANT CHANGE UP (ref 150–400)
PLATELET COUNT - ESTIMATE: NORMAL — SIGNIFICANT CHANGE UP
POTASSIUM SERPL-MCNC: 3.9 MMOL/L — SIGNIFICANT CHANGE UP (ref 3.5–5.3)
POTASSIUM SERPL-SCNC: 3.9 MMOL/L — SIGNIFICANT CHANGE UP (ref 3.5–5.3)
PROT SERPL-MCNC: 8.1 GM/DL — SIGNIFICANT CHANGE UP (ref 6–8.3)
PROT UR-MCNC: 30 MG/DL
RBC # BLD: 5.09 M/UL — SIGNIFICANT CHANGE UP (ref 4.2–5.8)
RBC # FLD: 14.1 % — SIGNIFICANT CHANGE UP (ref 10.3–14.5)
RBC BLD AUTO: NORMAL — SIGNIFICANT CHANGE UP
RBC CASTS # UR COMP ASSIST: NEGATIVE /HPF — SIGNIFICANT CHANGE UP (ref 0–4)
SODIUM SERPL-SCNC: 138 MMOL/L — SIGNIFICANT CHANGE UP (ref 135–145)
SP GR SPEC: 1.02 — SIGNIFICANT CHANGE UP (ref 1.01–1.02)
UROBILINOGEN FLD QL: 1
VARIANT LYMPHS # BLD: 3 % — SIGNIFICANT CHANGE UP (ref 0–6)
WBC # BLD: 12.06 K/UL — HIGH (ref 3.8–10.5)
WBC # FLD AUTO: 12.06 K/UL — HIGH (ref 3.8–10.5)
WBC UR QL: SIGNIFICANT CHANGE UP /HPF (ref 0–5)

## 2023-04-03 PROCEDURE — 81001 URINALYSIS AUTO W/SCOPE: CPT

## 2023-04-03 PROCEDURE — 36415 COLL VENOUS BLD VENIPUNCTURE: CPT

## 2023-04-03 PROCEDURE — 74176 CT ABD & PELVIS W/O CONTRAST: CPT | Mod: 26,MA

## 2023-04-03 PROCEDURE — 80053 COMPREHEN METABOLIC PANEL: CPT

## 2023-04-03 PROCEDURE — 96374 THER/PROPH/DIAG INJ IV PUSH: CPT

## 2023-04-03 PROCEDURE — 99284 EMERGENCY DEPT VISIT MOD MDM: CPT

## 2023-04-03 PROCEDURE — 74176 CT ABD & PELVIS W/O CONTRAST: CPT | Mod: MA

## 2023-04-03 PROCEDURE — 85025 COMPLETE CBC W/AUTO DIFF WBC: CPT

## 2023-04-03 PROCEDURE — 87086 URINE CULTURE/COLONY COUNT: CPT

## 2023-04-03 PROCEDURE — 83690 ASSAY OF LIPASE: CPT

## 2023-04-03 PROCEDURE — 99284 EMERGENCY DEPT VISIT MOD MDM: CPT | Mod: 25

## 2023-04-03 PROCEDURE — 83735 ASSAY OF MAGNESIUM: CPT

## 2023-04-03 RX ORDER — SODIUM CHLORIDE 9 MG/ML
1000 INJECTION INTRAMUSCULAR; INTRAVENOUS; SUBCUTANEOUS ONCE
Refills: 0 | Status: COMPLETED | OUTPATIENT
Start: 2023-04-03 | End: 2023-04-03

## 2023-04-03 RX ORDER — LIDOCAINE 4 G/100G
1 CREAM TOPICAL
Qty: 1 | Refills: 0
Start: 2023-04-03

## 2023-04-03 RX ORDER — LIDOCAINE 4 G/100G
1 CREAM TOPICAL ONCE
Refills: 0 | Status: COMPLETED | OUTPATIENT
Start: 2023-04-03 | End: 2023-04-03

## 2023-04-03 RX ORDER — ACETAMINOPHEN 500 MG
1000 TABLET ORAL ONCE
Refills: 0 | Status: COMPLETED | OUTPATIENT
Start: 2023-04-03 | End: 2023-04-03

## 2023-04-03 RX ADMIN — LIDOCAINE 1 PATCH: 4 CREAM TOPICAL at 18:28

## 2023-04-03 RX ADMIN — Medication 400 MILLIGRAM(S): at 18:28

## 2023-04-03 RX ADMIN — SODIUM CHLORIDE 2000 MILLILITER(S): 9 INJECTION INTRAMUSCULAR; INTRAVENOUS; SUBCUTANEOUS at 18:28

## 2023-04-03 NOTE — ED STATDOCS - OBJECTIVE STATEMENT
68 y/o male with no PMHx presents to the ED c/o lower back pain. Pt reports he fell on ice about 3 weeks ago, landed on his back and twisted his leg. Last Tuesday, began to develop flu-like symptoms. Pt went to UC on Friday, was told he had a UTI and prescribed cipro. Since then, c/o worsening lower back pain. Denies urinary or bowel incontinence. No abdominal pain, reports urinary symptoms have resolved. Hx of knee surgery. 66 y/o male with no PMHx presents to the ED c/o lower back pain. Pt reports he fell on ice about 3 weeks ago, landed on his back and twisted his leg. Last Tuesday, began to develop flu-like symptoms. Pt went to  on Friday, was told he had a UTI and prescribed cipro. Since then, c/o worsening lower back pain. Denies urinary or bowel incontinence. no saddle anesthesia. No abdominal pain, reports urinary symptoms have resolved. Hx of knee surgery.

## 2023-04-03 NOTE — ED STATDOCS - NSFOLLOWUPINSTRUCTIONS_ED_ALL_ED_FT
Cirrhosis    Cirrhosis is long-term (chronic) liver injury. The liver is the body's largest internal organ, and it performs many functions. It converts food into energy, removes toxic material from the blood, makes important proteins, and absorbs necessary vitamins from food.    In cirrhosis, healthy liver cells are replaced by scar tissue. This prevents blood from flowing through the liver and makes it difficult for the liver to complete its functions.    What are the causes?  Common causes of this condition are hepatitis C and long-term alcohol abuse. Other causes include:  Nonalcoholic fatty liver disease (NAFLD). This happens when fat is deposited in the liver by causes other than alcohol.  Hepatitis B infection.  Autoimmune hepatitis. In this condition, the body's defense system (immune system) mistakenly attacks the liver cells, causing inflammation.  Diseases that cause blockage of ducts inside the liver.  Inherited liver diseases, such as hemochromatosis. This is one of the most common inherited liver diseases. In this disease, deposits of iron collect in the liver and other organs.  Reactions to certain long-term medicines, such as amiodarone, a heart medicine.  Parasitic infections. These include schistosomiasis, which is caused by a flatworm.  Long-term contact to certain toxins. These toxins include certain organic solvents, such as toluene and chloroform.  What increases the risk?  You are more likely to develop this condition if:  You have certain types of viral hepatitis.  You abuse alcohol, especially if you are female.  You are overweight.  You use IV drugs and share needles.  You have unprotected sex with someone who has viral hepatitis.  What are the signs or symptoms?  You may not have any signs and symptoms at first. Symptoms may not develop until the damage to your liver starts to get worse.    Early symptoms may include:  Weakness and tiredness (fatigue).  Changes in sleep patterns or having trouble sleeping.  Itchiness.  Tenderness in the right-upper part of your abdomen.  Weight loss and muscle loss.  Nausea.  Loss of appetite.  Later symptoms may include:  Fatigue or weakness that is getting worse.  Yellow skin and eyes (jaundice).  Buildup of fluid in the abdomen (ascites). You may notice that your clothes are tight around your waist.  Weight gain and swelling of the feet and ankles (edema).  Trouble breathing.  Easy bruising and bleeding.  Vomiting blood, or black or bloody stool.  Mental confusion.  How is this diagnosed?  Your health care provider may suspect cirrhosis based on your symptoms and medical history, especially if you have other medical conditions or a history of alcohol abuse. Your health care provider will do a physical exam to feel your liver and to check for signs of cirrhosis. Tests may include:  Blood tests to check:  For hepatitis B or C.  Kidney function.  Liver function.  Imaging tests such as:  MRI or CT scan to look for changes seen in advanced cirrhosis.  Ultrasound to see if normal liver tissue is being replaced by scar tissue.  A procedure in which a long needle is used to take a sample of liver tissue to be checked in a lab (biopsy). Liver biopsy can confirm the diagnosis of cirrhosis.  How is this treated?  Treatment for this condition depends on how damaged your liver is and what caused the damage. It may include treating the symptoms of cirrhosis, or treating the underlying causes to slow the damage. Treatment may include:  Making lifestyle changes, such as:  Eating a healthy diet. You may need to work with your health care provider or a dietitian to develop an eating plan.  Restricting salt intake.  Maintaining a healthy weight.  Not abusing drugs or alcohol.  Taking medicines to:  Treat liver infections or other infections.  Control itching.  Reduce fluid buildup.  Reduce certain blood toxins.  Reduce risk of bleeding from enlarged blood vessels in the stomach or esophagus (varices).  Liver transplant. In this procedure, a liver from a donor is used to replace your diseased liver. This is done if cirrhosis has caused liver failure.  Other treatments and procedures may be done depending on the problems that you get from cirrhosis. Common problems include liver-related kidney failure (hepatorenal syndrome).    Follow these instructions at home:    Take medicines only as told by your health care provider. Do not use medicines that are toxic to your liver. Ask your health care provider before taking any new medicines, including over-the-counter medicines such as NSAIDs.  Rest as needed.  Eat a well-balanced diet.  Limit your salt or water intake, if your health care provider asks you to do this.  Do not drink alcohol. This is especially important if you routinely take acetaminophen.  Keep all follow-up visits. This is important.  Contact a health care provider if you:  Have fatigue or weakness that is getting worse.  Develop swelling of the hands, feet, or legs, or a buildup of fluid in the abdomen (ascites).  Have a fever or chills.  Develop loss of appetite.  Have nausea or vomiting.  Develop jaundice.  Develop easy bruising or bleeding.  Get help right away if you:  Vomit bright red blood or a material that looks like coffee grounds.  Have blood in your stools.  Notice that your stools appear black and tarry.  Become confused.  Have chest pain or trouble breathing.  These symptoms may represent a serious problem that is an emergency. Do not wait to see if the symptoms will go away. Get medical help right away. Call your local emergency services (911 in the [place] Do not drive yourself to the hospital.    Summary  Cirrhosis is chronic liver injury. Common causes are hepatitis C and long-term alcohol abuse.  Tests used to diagnose cirrhosis include blood tests, imaging tests, and liver biopsy.  Treatment for this condition involves treating the underlying cause. Avoid alcohol, drugs, salt, and medicines that may damage your liver.  Get help right away if you vomit bright red blood or a material that looks like coffee grounds.  This information is not intended to replace advice given to you by your health care provider. Make sure you discuss any questions you have with your health care provider.      Back Pain    Back pain is very common in adults. The cause of back pain is rarely dangerous and the pain often gets better over time. The cause of your back pain may not be known and may include strain of muscles or ligaments, degeneration of the spinal disks, or arthritis. Occasionally the pain may radiate down your leg(s). Over-the-counter medicines to reduce pain and inflammation are often the most helpful. Stretching and remaining active frequently helps the healing process.     SEEK IMMEDIATE MEDICAL CARE IF YOU HAVE ANY OF THE FOLLOWING SYMPTOMS: bowel or bladder control problems, unusual weakness or numbness in your arms or legs, nausea or vomiting, abdominal pain, fever, dizziness/lightheadedness.

## 2023-04-03 NOTE — ED STATDOCS - PHYSICAL EXAMINATION
Constitutional: Awake, Alert, non-toxic. No acute distress. Well appearing, well nourished.   HEAD: Normocephalic, atraumatic.   EYES: PERRL, EOM intact, conjunctiva and sclera are clear bilaterally.  ENT: External ears normal. No rhinorrhea, no tracheal deviation   NECK: Supple, non-tender  CARDIOVASCULAR: regular rate and rhythm.  RESPIRATORY: Normal respiratory effort; breath sounds CTAB, no wheezes, rhonchi, or rales. Speaking in full sentences. No accessory muscle use.   ABDOMEN: Soft; non-tender, non-distended. No rebound or guarding.   MSK:  no lower extremity edema, no deformities. Lumbar spine point TTP no deformity  SKIN: Warm, dry  NEURO: A&O x3. Sensory and motor functions are grossly intact. Speech is normal. No facial droop.  PSYCH: Appearance and judgement seem appropriate for gender and age. Constitutional: Awake, Alert, non-toxic. No acute distress. Well appearing, well nourished.   HEAD: Normocephalic, atraumatic.   EYES: PERRL, EOM intact, conjunctiva and sclera are clear bilaterally.  ENT: External ears normal. No rhinorrhea, no tracheal deviation   NECK: Supple, non-tender  CARDIOVASCULAR: regular rate and rhythm.  RESPIRATORY: Normal respiratory effort; breath sounds CTAB, no wheezes, rhonchi, or rales. Speaking in full sentences. No accessory muscle use.   ABDOMEN: Soft; non-tender, non-distended. No rebound or guarding.   MSK:  no lower extremity edema, no deformities. Lumbar spine point TTP, no deformity  SKIN: Warm, dry  NEURO: A&O x3. Sensory and motor functions are grossly intact. Speech is normal. No facial droop.  PSYCH: Appearance and judgement seem appropriate for gender and age.

## 2023-04-03 NOTE — ED ADULT NURSE NOTE - OBJECTIVE STATEMENT
Patient with complaints of lower back pain; recently diagnosed with a UTI at urgent care; has been on antibiotics since Friday, but has no relief of back pain; patient denies any fevers or urinary symptoms and state that those have resolved; patient endorses that he did fall over 3 weeks ago, but did not have any back pain after; pain only developed once his urinary symptoms started; taking tylenol with no relief; no numbness/tingling, no incontinence- FRANCISCO Salamanca RN

## 2023-04-03 NOTE — ED STATDOCS - NS ED ROS FT
Constitutional: negative for fevers/chills  HENT: no hearing problems, sore throat, nasal congestion  Eyes: no visual disturbances  Neck: no neck stiffness or neck pain  Cardiovascular: no chest pain, no palpitations, no edema  Respiratory: no cough, no shortness of breath  Musculoskeletal: + Back pain, no pain of extremities  Gastrointestinal: no abdominal pain, nausea, vomiting  : no dysuria or hematuria or polyuria  Neuro: no headaches, no numbness or tingling, no dizziness  Skin: no rashes or wounds

## 2023-04-03 NOTE — ED STATDOCS - PROGRESS NOTE DETAILS
pt aware of ct results and lab results, pt admits to drinking daily and aware of his LFTS being elevated. pt know he needs to stop drinking and fu with GI and PMD. pt well appearing on dc and agrees with plan. -May Hernandez PA-C

## 2023-04-03 NOTE — ED STATDOCS - CARE PROVIDER_API CALL
Caio Kahn)  Gastroenterology  [address] [place] [address]  Phone: [phone]  Fax: [phone]  Follow Up Time: Urgent

## 2023-04-03 NOTE — ED STATDOCS - CLINICAL SUMMARY MEDICAL DECISION MAKING FREE TEXT BOX
Likely lumbar strain versus fracture given fall. No evidence of spinal cord pathology on Hx or exam. Given recent UTI, will check labs and UA for renal stone. Plan on labs, imaging, and meds.

## 2023-04-03 NOTE — ED ADULT TRIAGE NOTE - NS ED TRIAGE AVPU SCALE
with patient
Alert-The patient is alert, awake and responds to voice. The patient is oriented to time, place, and person. The triage nurse is able to obtain subjective information.

## 2023-04-03 NOTE — ED STATDOCS - ATTENDING APP SHARED VISIT CONTRIBUTION OF CARE
I, Espinoza Guerra MD,  performed the initial face to face bedside interview with this patient regarding history of present illness, review of symptoms and relevant past medical, social and family history.  I completed an independent physical examination.  I was the initial provider who evaluated this patient.   I personally saw the patient and performed a substantive portion of the visit including all aspects of the medical decision making.  I have signed out the follow up of any pending tests (i.e. labs, radiological studies) to the [facility].  I have communicated the patient’s plan of care and disposition with the [facility].  The history, relevant review of systems, past medical and surgical history, medical decision making, and physical examination was documented by the scribe in my presence and I attest to the accuracy of the documentation.

## 2023-04-03 NOTE — ED STATDOCS - PATIENT PORTAL LINK FT
You can access the FollowMyHealth Patient Portal offered by [facility] by registering at the following website: [email or web address]. By joining [employer]’s FollowMyHealth portal, you will also be able to view your health information using other applications (apps) compatible with our system.

## 2023-04-03 NOTE — ED STATDOCS - NS ED ATTENDING STATEMENT MOD
This was a shared visit with the [facility]. I reviewed and verified the documentation and independently performed the documented:

## 2023-04-04 LAB
CULTURE RESULTS: NO GROWTH — SIGNIFICANT CHANGE UP
SPECIMEN SOURCE: SIGNIFICANT CHANGE UP

## 2023-05-01 ENCOUNTER — APPOINTMENT (OUTPATIENT)
Dept: ORTHOPEDIC SURGERY | Facility: CLINIC | Age: 68
End: 2023-05-01
Payer: COMMERCIAL

## 2023-05-01 VITALS — WEIGHT: 234 LBS | HEIGHT: 77 IN | BODY MASS INDEX: 27.63 KG/M2

## 2023-05-01 DIAGNOSIS — S93.491A SPRAIN OF OTHER LIGAMENT OF RIGHT ANKLE, INITIAL ENCOUNTER: ICD-10-CM

## 2023-05-01 DIAGNOSIS — Z96.651 PRESENCE OF RIGHT ARTIFICIAL KNEE JOINT: ICD-10-CM

## 2023-05-01 PROCEDURE — 73562 X-RAY EXAM OF KNEE 3: CPT | Mod: RT

## 2023-05-01 PROCEDURE — 99214 OFFICE O/P EST MOD 30 MIN: CPT

## 2023-05-01 PROCEDURE — 73600 X-RAY EXAM OF ANKLE: CPT | Mod: RT

## 2023-05-01 NOTE — IMAGING
[AP] : anteroposterior [Lateral] : lateral [[place]] : skyline [Components well fixed, in good position] : Components well fixed, in good position [Right] : right ankle [Weight -] : weightbearing [There are no fractures, subluxations or dislocations. No significant abnormalities are seen] : There are no fractures, subluxations or dislocations. No significant abnormalities are seen [de-identified] : Effected side: right \par Incision well healed\par Sensation intact\par +1 edema LE\par Decreased ROM: \par \par Xray 3 views knee - Implants good position and well fixed - no fracture or dislocation\par \par right ankle: \par AFTL tenderness\par mild swelling \par

## 2023-05-01 NOTE — HISTORY OF PRESENT ILLNESS
[0] : 0 [de-identified] : 5/1/23: 9 months postop R TKA he was doing well until March when he slipped on ice and fell on his knee and ankle. He has minimal pain at this point but continues to have swelling in the ankle\par \par Previous doc:\par RIght knee pain x 2 years. NO relief with cortisone and HA injections. Pain daily, worse with walking. No groin pain.\par 8/29/22: 2 weeks s/p right TKA min pain. Home PT. \par 2/20/23: Now 6 months s/p R TKA. Notes new medial pain and "crunching" of the knee for the past month. No new injury.\par \par  [] : no [FreeTextEntry1] : RT Knee  [FreeTextEntry5] : Pt 3 weeks ago slipped and fell on ice and his leg went behind him. Pt has no pain today in the knee and is here to check on the implant

## 2023-05-01 NOTE — ASSESSMENT
[FreeTextEntry1] : I see no obvious reason for pain - he does have some increased laxity but still stable -  cont normal activity and retrun if worse - as of now on ly min imal pain \par \par 5/1/23: Increase in right knee and ankle pain s/p fall in March. Minimal pain today, XR's look good. Also likely anteriro ankle sprain. He will continue rest and ice prn. no knee pain today on exam

## 2023-05-05 ENCOUNTER — APPOINTMENT (OUTPATIENT)
Dept: GASTROENTEROLOGY | Facility: CLINIC | Age: 68
End: 2023-05-05
Payer: COMMERCIAL

## 2023-05-05 VITALS
BODY MASS INDEX: 27.63 KG/M2 | HEART RATE: 72 BPM | DIASTOLIC BLOOD PRESSURE: 95 MMHG | HEIGHT: 77 IN | WEIGHT: 234 LBS | SYSTOLIC BLOOD PRESSURE: 154 MMHG

## 2023-05-05 DIAGNOSIS — K74.60 UNSPECIFIED CIRRHOSIS OF LIVER: ICD-10-CM

## 2023-05-05 PROCEDURE — 99203 OFFICE O/P NEW LOW 30 MIN: CPT

## 2023-05-05 NOTE — HISTORY OF PRESENT ILLNESS
[FreeTextEntry1] : Mr. DAHIANA HERNANDEZ is a 67 year old male with history of hepatitis C.\par Patient was treated, and told of a negative PCR. Patient has a\par  history of heavy ETOH in the past, but not recently. Patient was in hospital with other issue - CT demonstrated cirrois with \par

## 2023-05-05 NOTE — ASSESSMENT
[FreeTextEntry1] : 68 yo male with history of cirrhosis or ?etiology. Will obtain labs, fibroscan. Patietn advised to acoid ETOHG. Follow up in one month.

## 2023-05-05 NOTE — PHYSICAL EXAM

## 2023-06-20 ENCOUNTER — OFFICE (OUTPATIENT)
Dept: URBAN - METROPOLITAN AREA CLINIC 102 | Facility: CLINIC | Age: 68
Setting detail: OPHTHALMOLOGY
End: 2023-06-20
Payer: COMMERCIAL

## 2023-06-20 DIAGNOSIS — G51.0: ICD-10-CM

## 2023-06-20 DIAGNOSIS — H52.4: ICD-10-CM

## 2023-06-20 DIAGNOSIS — H25.13: ICD-10-CM

## 2023-06-20 DIAGNOSIS — H43.393: ICD-10-CM

## 2023-06-20 PROBLEM — H52.7 REFRACTIVE ERROR: Status: ACTIVE | Noted: 2023-06-20

## 2023-06-20 PROCEDURE — 92020 GONIOSCOPY: CPT | Performed by: OPHTHALMOLOGY

## 2023-06-20 PROCEDURE — 92004 COMPRE OPH EXAM NEW PT 1/>: CPT | Performed by: OPHTHALMOLOGY

## 2023-06-20 PROCEDURE — 92015 DETERMINE REFRACTIVE STATE: CPT | Performed by: OPHTHALMOLOGY

## 2023-06-20 ASSESSMENT — REFRACTION_MANIFEST
OS_AXIS: 085
OS_ADD: +2.50
OS_VA1: 20/20
OD_AXIS: 100
OD_ADD: +2.50
OD_CYLINDER: -0.75
OD_SPHERE: +2.00
OS_CYLINDER: -1.75
OD_VA1: 20/20
OU_VA: 20/20
OS_SPHERE: +2.00

## 2023-06-20 ASSESSMENT — SPHEQUIV_DERIVED
OS_SPHEQUIV: 1.125
OD_SPHEQUIV: 2
OS_SPHEQUIV: 1.375
OD_SPHEQUIV: 1.625

## 2023-06-20 ASSESSMENT — KERATOMETRY
OD_K1POWER_DIOPTERS: 41.75
METHOD_AUTO_MANUAL: AUTO
OS_AXISANGLE_DEGREES: 162
OD_AXISANGLE_DEGREES: 031
OD_K2POWER_DIOPTERS: 42.50
OS_K2POWER_DIOPTERS: 43.00
OS_K1POWER_DIOPTERS: 41.75

## 2023-06-20 ASSESSMENT — REFRACTION_CURRENTRX
OS_AXIS: 079
OD_ADD: +2.25
OD_VPRISM_DIRECTION: PROGS
OS_ADD: +2.25
OD_CYLINDER: -1.00
OS_SPHERE: +1.50
OS_VPRISM_DIRECTION: PROGS
OD_SPHERE: +1.75
OS_OVR_VA: 20/
OS_CYLINDER: -1.50
OD_OVR_VA: 20/
OD_AXIS: 114

## 2023-06-20 ASSESSMENT — TONOMETRY
OS_IOP_MMHG: 16
OD_IOP_MMHG: 15

## 2023-06-20 ASSESSMENT — AXIALLENGTH_DERIVED
OS_AL: 23.4701
OD_AL: 23.4645
OD_AL: 23.3209
OS_AL: 23.5668

## 2023-06-20 ASSESSMENT — CONFRONTATIONAL VISUAL FIELD TEST (CVF)
OS_FINDINGS: FULL
OD_FINDINGS: FULL

## 2023-06-20 ASSESSMENT — REFRACTION_AUTOREFRACTION
OD_AXIS: 101
OS_AXIS: 084
OD_CYLINDER: -1.00
OS_CYLINDER: -1.75
OS_SPHERE: +2.25
OD_SPHERE: +2.50

## 2023-06-20 ASSESSMENT — VISUAL ACUITY
OD_BCVA: 20/25
OS_BCVA: 20/25

## 2023-09-21 ASSESSMENT — KOOS JR
BENDING TO THE FLOOR TO PICK UP OBJECT: MILD
IMPORTED KOOS JR SCORE: 16.0
IMPORTED FORM: YES
HOW SEVERE IS YOUR KNEE STIFFNESS AFTER FIRST WAKING IN MORNING: MODERATE
STRAIGHTENING KNEE FULLY: SEVERE
STANDING UPRIGHT: MODERATE
IMPORTED LATERALITY: RIGHT
RISING FROM SITTING: MILD
KOOS JR RAW SCORE: 16
GOING UP OR DOWN STAIRS: EXTREME
TWISING OR PIVOTING ON KNEE: SEVERE

## 2023-10-02 ENCOUNTER — APPOINTMENT (OUTPATIENT)
Dept: ORTHOPEDIC SURGERY | Facility: CLINIC | Age: 68
End: 2023-10-02

## 2024-12-16 ENCOUNTER — OUTPATIENT (OUTPATIENT)
Dept: OUTPATIENT SERVICES | Facility: HOSPITAL | Age: 69
LOS: 1 days | End: 2024-12-16
Payer: COMMERCIAL

## 2024-12-16 ENCOUNTER — APPOINTMENT (OUTPATIENT)
Dept: ULTRASOUND IMAGING | Facility: CLINIC | Age: 69
End: 2024-12-16
Payer: COMMERCIAL

## 2024-12-16 DIAGNOSIS — Z00.8 ENCOUNTER FOR OTHER GENERAL EXAMINATION: ICD-10-CM

## 2024-12-16 DIAGNOSIS — Z98.890 OTHER SPECIFIED POSTPROCEDURAL STATES: Chronic | ICD-10-CM

## 2024-12-16 PROCEDURE — 76700 US EXAM ABDOM COMPLETE: CPT | Mod: 26

## 2024-12-16 PROCEDURE — 76700 US EXAM ABDOM COMPLETE: CPT

## 2025-04-08 ENCOUNTER — OFFICE (OUTPATIENT)
Dept: URBAN - METROPOLITAN AREA CLINIC 102 | Facility: CLINIC | Age: 70
Setting detail: OPHTHALMOLOGY
End: 2025-04-08
Payer: COMMERCIAL

## 2025-04-08 DIAGNOSIS — G51.0: ICD-10-CM

## 2025-04-08 DIAGNOSIS — H43.393: ICD-10-CM

## 2025-04-08 DIAGNOSIS — H25.13: ICD-10-CM

## 2025-04-08 PROCEDURE — 92014 COMPRE OPH EXAM EST PT 1/>: CPT | Performed by: OPHTHALMOLOGY

## 2025-04-08 PROCEDURE — 92020 GONIOSCOPY: CPT | Performed by: OPHTHALMOLOGY

## 2025-04-08 ASSESSMENT — VISUAL ACUITY
OD_BCVA: 20/25+1
OS_BCVA: 20/25+2

## 2025-04-08 ASSESSMENT — KERATOMETRY
OD_AXISANGLE_DEGREES: 017
OS_AXISANGLE_DEGREES: 160
METHOD_AUTO_MANUAL: AUTO
OS_K2POWER_DIOPTERS: 43.00
OD_K1POWER_DIOPTERS: 41.50
OS_K1POWER_DIOPTERS: 41.50
OD_K2POWER_DIOPTERS: 42.25

## 2025-04-08 ASSESSMENT — REFRACTION_CURRENTRX
OD_ADD: +2.50
OD_VPRISM_DIRECTION: PROGS
OD_SPHERE: +2.00
OS_AXIS: 083
OD_AXIS: 104
OS_OVR_VA: 20/
OS_SPHERE: +1.75
OD_OVR_VA: 20/
OS_ADD: +2.50
OD_CYLINDER: -0.75
OS_CYLINDER: -1.25
OS_VPRISM_DIRECTION: PROGS

## 2025-04-08 ASSESSMENT — REFRACTION_AUTOREFRACTION
OD_SPHERE: +2.50
OS_AXIS: 080
OD_CYLINDER: -1.50
OS_SPHERE: +2.25
OS_CYLINDER: -2.00
OD_AXIS: 095

## 2025-04-08 ASSESSMENT — REFRACTION_MANIFEST
OS_SPHERE: +2.00
OD_SPHERE: +2.00
OD_ADD: +2.50
OD_VA1: 20/20
OS_ADD: +2.50
OS_VA1: 20/20
OD_AXIS: 100
OU_VA: 20/20
OS_AXIS: 085
OS_CYLINDER: -1.75
OD_CYLINDER: -0.75

## 2025-04-08 ASSESSMENT — TONOMETRY
OD_IOP_MMHG: 18
OS_IOP_MMHG: 17

## 2025-04-08 ASSESSMENT — CONFRONTATIONAL VISUAL FIELD TEST (CVF)
OS_FINDINGS: FULL
OD_FINDINGS: FULL